# Patient Record
Sex: FEMALE | Race: WHITE | NOT HISPANIC OR LATINO | URBAN - METROPOLITAN AREA
[De-identification: names, ages, dates, MRNs, and addresses within clinical notes are randomized per-mention and may not be internally consistent; named-entity substitution may affect disease eponyms.]

---

## 2021-05-14 ENCOUNTER — OFFICE VISIT (OUTPATIENT)
Dept: URBAN - METROPOLITAN AREA CLINIC 87 | Facility: CLINIC | Age: 71
End: 2021-05-14
Payer: MEDICARE

## 2021-05-14 DIAGNOSIS — H25.11 AGE-RELATED NUCLEAR CATARACT, RIGHT EYE: ICD-10-CM

## 2021-05-14 DIAGNOSIS — H43.823 VITREOMACULAR ADHESION, BILATERAL: ICD-10-CM

## 2021-05-14 DIAGNOSIS — H35.341 MACULAR HOLE OF RIGHT EYE: Primary | ICD-10-CM

## 2021-05-14 PROCEDURE — 99204 OFFICE O/P NEW MOD 45 MIN: CPT | Performed by: OPHTHALMOLOGY

## 2021-05-14 PROCEDURE — 92134 CPTRZ OPH DX IMG PST SGM RTA: CPT | Performed by: OPHTHALMOLOGY

## 2021-05-14 ASSESSMENT — INTRAOCULAR PRESSURE
OD: 19
OS: 16

## 2021-05-14 NOTE — IMPRESSION/PLAN
Impression: Age-related nuclear cataract, right eye: H25.11. Plan: Refer for cataract surgery. Will need this prior to retina surgery.

## 2021-05-14 NOTE — IMPRESSION/PLAN
Impression: Macular hole of right eye: H35.341.
-unknown duration OCT:
OD: VMT, small MH 150um OS: VMT Plan: Examination and OCT reveal a full-thickness macular hole. The diagnosis, natural history, and prognosis of full-thickness macular hole, as well as the risks and benefits of vitrectomy/membrane peeling/gas with positioning versus observation were discussed at length. Given the patient's current visual acuity and hindrance on activities of daily living, surgical correction was recommended. The patient was informed of altitude precautions and the danger of blindness or loss of the eye with travel to higher altitude or with flying. The patient understands that if the hole is successfully closed, the vision usually improves; however the vision does not typically return to normal, and improvement usually takes place gradually over a period of several months to one year. Rec surgery but will need cat sx first given density of cataract. 

Follow up with me after cataract surgery is complete to schedule macular hole repair

## 2022-02-21 NOTE — IMPRESSION/PLAN
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 Occasionally, the traction will progress to a full thickness macular hole. Options such as observation, Rutha Jaegers, and vitrectomy were discussed at length. After assessing the patients level of visual impairment due to this condition, surgery OD and observation OS was recommended. The patient understands that while the distortion should gerald with treatment, the final acuity, which can take months to achieve, may or may not be an improvement over baseline. show